# Patient Record
(demographics unavailable — no encounter records)

---

## 2025-06-03 NOTE — PHYSICAL EXAM
[de-identified] :  General: No acute distress Respiratory: Nonlabored Cardiology: Warm and well-perfused  Right upper extremity  Full painless elbow ROM Only tender directly over medial epicondyle on deep palpation and when applied a valgus force to elbow No instability No pain elsewhere Motor: AIN PIN U intact Sensation: No numbness or tingling Warm and well-perfused with brisk cap refill  [de-identified] :  3 views of the right elbow were obtained and reviewed in clinic showing no fractures or dislocations, preserved joint spaces, no lesions, and no soft tissue abnormalities. There is potentially some widening of the medial epicondyle physis

## 2025-06-03 NOTE — HISTORY OF PRESENT ILLNESS
[FreeTextEntry1] : Right elbow pain for a few weeks  Pitcher. End of regular season. Hx of [employer] shoulder. Pain over medial epicondyle and only with pitching. Able to bat and play other sports. No treatment thus far

## 2025-06-03 NOTE — HISTORY OF PRESENT ILLNESS
[FreeTextEntry1] : Right elbow pain for a few weeks  Pitcher. End of regular season. Hx of [employer] shoulder. Pain over medial epicondyle and only with pitching. Able to bat and play other sports. No treatment thus far

## 2025-06-03 NOTE — PHYSICAL EXAM
[de-identified] :  General: No acute distress Respiratory: Nonlabored Cardiology: Warm and well-perfused  Right upper extremity  Full painless elbow ROM Only tender directly over medial epicondyle on deep palpation and when applied a valgus force to elbow No instability No pain elsewhere Motor: AIN PIN U intact Sensation: No numbness or tingling Warm and well-perfused with brisk cap refill  [de-identified] :  3 views of the right elbow were obtained and reviewed in clinic showing no fractures or dislocations, preserved joint spaces, no lesions, and no soft tissue abnormalities. There is potentially some widening of the medial epicondyle physis

## 2025-06-03 NOTE — ASSESSMENT
[FreeTextEntry1] : 11m with right [employer] elbow    - Discussed diagnosis, natural history of condition, and treatment options - Rest from pitching for 3 months - return in 1.5 months for repeat eval - can otherwise continue with all activities as long as no pain - should be able to pitch in fall -All questions answered, patient in agreement